# Patient Record
Sex: MALE | Race: WHITE | NOT HISPANIC OR LATINO | Employment: UNEMPLOYED | URBAN - METROPOLITAN AREA
[De-identification: names, ages, dates, MRNs, and addresses within clinical notes are randomized per-mention and may not be internally consistent; named-entity substitution may affect disease eponyms.]

---

## 2017-01-05 ENCOUNTER — LAB REQUISITION (OUTPATIENT)
Dept: LAB | Facility: HOSPITAL | Age: 52
End: 2017-01-05

## 2017-01-05 DIAGNOSIS — D12.4 BENIGN NEOPLASM OF DESCENDING COLON: ICD-10-CM

## 2017-01-05 DIAGNOSIS — D12.0 BENIGN NEOPLASM OF CECUM: ICD-10-CM

## 2017-01-05 DIAGNOSIS — D12.3 BENIGN NEOPLASM OF TRANSVERSE COLON: ICD-10-CM

## 2017-01-05 PROCEDURE — 88305 TISSUE EXAM BY PATHOLOGIST: CPT | Performed by: INTERNAL MEDICINE

## 2017-01-06 LAB
CYTO UR: NORMAL
LAB AP CASE REPORT: NORMAL
LAB AP CLINICAL INFORMATION: NORMAL
Lab: NORMAL
PATH REPORT.FINAL DX SPEC: NORMAL
PATH REPORT.GROSS SPEC: NORMAL

## 2019-07-28 ENCOUNTER — HOSPITAL ENCOUNTER (EMERGENCY)
Facility: HOSPITAL | Age: 54
Discharge: HOME | End: 2019-07-28
Attending: STUDENT IN AN ORGANIZED HEALTH CARE EDUCATION/TRAINING PROGRAM | Admitting: STUDENT IN AN ORGANIZED HEALTH CARE EDUCATION/TRAINING PROGRAM

## 2019-07-28 ENCOUNTER — APPOINTMENT (OUTPATIENT)
Dept: CT IMAGING | Facility: HOSPITAL | Age: 54
End: 2019-07-28

## 2019-07-28 VITALS
WEIGHT: 228.8 LBS | RESPIRATION RATE: 18 BRPM | DIASTOLIC BLOOD PRESSURE: 78 MMHG | SYSTOLIC BLOOD PRESSURE: 148 MMHG | BODY MASS INDEX: 30.99 KG/M2 | TEMPERATURE: 98 F | OXYGEN SATURATION: 99 % | HEIGHT: 72 IN | HEART RATE: 72 BPM

## 2019-07-28 DIAGNOSIS — N32.89 BLADDER WALL THICKENING: ICD-10-CM

## 2019-07-28 DIAGNOSIS — M54.50 ACUTE BILATERAL LOW BACK PAIN WITHOUT SCIATICA: Primary | ICD-10-CM

## 2019-07-28 LAB
ALBUMIN SERPL-MCNC: 4.5 G/DL (ref 3.5–5)
ALBUMIN/GLOB SERPL: 1.3 G/DL (ref 1–2)
ALP SERPL-CCNC: 88 U/L (ref 38–126)
ALT SERPL W P-5'-P-CCNC: 24 U/L (ref 13–69)
ANION GAP SERPL CALCULATED.3IONS-SCNC: 15 MMOL/L (ref 10–20)
AST SERPL-CCNC: 26 U/L (ref 15–46)
BASOPHILS # BLD AUTO: 0.06 10*3/MM3 (ref 0–0.2)
BASOPHILS NFR BLD AUTO: 0.9 % (ref 0–1.5)
BILIRUB SERPL-MCNC: 0.8 MG/DL (ref 0.2–1.3)
BILIRUB UR QL STRIP: NEGATIVE
BUN BLD-MCNC: 16 MG/DL (ref 7–20)
BUN/CREAT SERPL: 14.5 (ref 6.3–21.9)
CALCIUM SPEC-SCNC: 9.1 MG/DL (ref 8.4–10.2)
CHLORIDE SERPL-SCNC: 102 MMOL/L (ref 98–107)
CLARITY UR: CLEAR
CO2 SERPL-SCNC: 27 MMOL/L (ref 26–30)
COLOR UR: YELLOW
CREAT BLD-MCNC: 1.1 MG/DL (ref 0.6–1.3)
DEPRECATED RDW RBC AUTO: 42.7 FL (ref 37–54)
EOSINOPHIL # BLD AUTO: 0.28 10*3/MM3 (ref 0–0.4)
EOSINOPHIL NFR BLD AUTO: 4.2 % (ref 0.3–6.2)
ERYTHROCYTE [DISTWIDTH] IN BLOOD BY AUTOMATED COUNT: 12.5 % (ref 12.3–15.4)
GFR SERPL CREATININE-BSD FRML MDRD: 70 ML/MIN/1.73
GLOBULIN UR ELPH-MCNC: 3.6 GM/DL
GLUCOSE BLD-MCNC: 85 MG/DL (ref 74–98)
GLUCOSE UR STRIP-MCNC: NEGATIVE MG/DL
HCT VFR BLD AUTO: 47.8 % (ref 37.5–51)
HGB BLD-MCNC: 15.7 G/DL (ref 13–17.7)
HGB UR QL STRIP.AUTO: NEGATIVE
IMM GRANULOCYTES # BLD AUTO: 0.01 10*3/MM3 (ref 0–0.05)
IMM GRANULOCYTES NFR BLD AUTO: 0.1 % (ref 0–0.5)
KETONES UR QL STRIP: NEGATIVE
LEUKOCYTE ESTERASE UR QL STRIP.AUTO: NEGATIVE
LYMPHOCYTES # BLD AUTO: 1.95 10*3/MM3 (ref 0.7–3.1)
LYMPHOCYTES NFR BLD AUTO: 29.1 % (ref 19.6–45.3)
MCH RBC QN AUTO: 30.3 PG (ref 26.6–33)
MCHC RBC AUTO-ENTMCNC: 32.8 G/DL (ref 31.5–35.7)
MCV RBC AUTO: 92.1 FL (ref 79–97)
MONOCYTES # BLD AUTO: 0.54 10*3/MM3 (ref 0.1–0.9)
MONOCYTES NFR BLD AUTO: 8.1 % (ref 5–12)
NEUTROPHILS # BLD AUTO: 3.86 10*3/MM3 (ref 1.7–7)
NEUTROPHILS NFR BLD AUTO: 57.6 % (ref 42.7–76)
NITRITE UR QL STRIP: NEGATIVE
NRBC BLD AUTO-RTO: 0 /100 WBC (ref 0–0.2)
PH UR STRIP.AUTO: <=5 [PH] (ref 5–8)
PLATELET # BLD AUTO: 243 10*3/MM3 (ref 140–450)
PMV BLD AUTO: 10.2 FL (ref 6–12)
POTASSIUM BLD-SCNC: 4 MMOL/L (ref 3.5–5.1)
PROT SERPL-MCNC: 8.1 G/DL (ref 6.3–8.2)
PROT UR QL STRIP: NEGATIVE
RBC # BLD AUTO: 5.19 10*6/MM3 (ref 4.14–5.8)
SODIUM BLD-SCNC: 140 MMOL/L (ref 137–145)
SP GR UR STRIP: >=1.03 (ref 1–1.03)
UROBILINOGEN UR QL STRIP: NORMAL
WBC NRBC COR # BLD: 6.7 10*3/MM3 (ref 3.4–10.8)

## 2019-07-28 PROCEDURE — 25010000002 MORPHINE PER 10 MG: Performed by: STUDENT IN AN ORGANIZED HEALTH CARE EDUCATION/TRAINING PROGRAM

## 2019-07-28 PROCEDURE — 80053 COMPREHEN METABOLIC PANEL: CPT | Performed by: STUDENT IN AN ORGANIZED HEALTH CARE EDUCATION/TRAINING PROGRAM

## 2019-07-28 PROCEDURE — 74176 CT ABD & PELVIS W/O CONTRAST: CPT

## 2019-07-28 PROCEDURE — 96374 THER/PROPH/DIAG INJ IV PUSH: CPT

## 2019-07-28 PROCEDURE — 96375 TX/PRO/DX INJ NEW DRUG ADDON: CPT

## 2019-07-28 PROCEDURE — 99283 EMERGENCY DEPT VISIT LOW MDM: CPT

## 2019-07-28 PROCEDURE — 25010000002 ONDANSETRON PER 1 MG: Performed by: STUDENT IN AN ORGANIZED HEALTH CARE EDUCATION/TRAINING PROGRAM

## 2019-07-28 PROCEDURE — 81003 URINALYSIS AUTO W/O SCOPE: CPT | Performed by: STUDENT IN AN ORGANIZED HEALTH CARE EDUCATION/TRAINING PROGRAM

## 2019-07-28 PROCEDURE — 25010000002 KETOROLAC TROMETHAMINE PER 15 MG: Performed by: STUDENT IN AN ORGANIZED HEALTH CARE EDUCATION/TRAINING PROGRAM

## 2019-07-28 PROCEDURE — 85025 COMPLETE CBC W/AUTO DIFF WBC: CPT | Performed by: STUDENT IN AN ORGANIZED HEALTH CARE EDUCATION/TRAINING PROGRAM

## 2019-07-28 RX ORDER — NAPROXEN 500 MG/1
500 TABLET ORAL 2 TIMES DAILY WITH MEALS
Qty: 20 TABLET | Refills: 0 | Status: SHIPPED | OUTPATIENT
Start: 2019-07-28

## 2019-07-28 RX ORDER — METHOCARBAMOL 750 MG/1
1500 TABLET, FILM COATED ORAL 3 TIMES DAILY PRN
Qty: 20 TABLET | Refills: 0 | Status: SHIPPED | OUTPATIENT
Start: 2019-07-28

## 2019-07-28 RX ORDER — MORPHINE SULFATE 4 MG/ML
4 INJECTION, SOLUTION INTRAMUSCULAR; INTRAVENOUS ONCE
Status: COMPLETED | OUTPATIENT
Start: 2019-07-28 | End: 2019-07-28

## 2019-07-28 RX ORDER — KETOROLAC TROMETHAMINE 30 MG/ML
30 INJECTION, SOLUTION INTRAMUSCULAR; INTRAVENOUS ONCE
Status: COMPLETED | OUTPATIENT
Start: 2019-07-28 | End: 2019-07-28

## 2019-07-28 RX ORDER — ONDANSETRON 2 MG/ML
4 INJECTION INTRAMUSCULAR; INTRAVENOUS ONCE
Status: COMPLETED | OUTPATIENT
Start: 2019-07-28 | End: 2019-07-28

## 2019-07-28 RX ADMIN — ONDANSETRON 4 MG: 2 INJECTION INTRAMUSCULAR; INTRAVENOUS at 17:46

## 2019-07-28 RX ADMIN — MORPHINE SULFATE 4 MG: 4 INJECTION INTRAVENOUS at 17:48

## 2019-07-28 RX ADMIN — SODIUM CHLORIDE 1000 ML: 9 INJECTION, SOLUTION INTRAVENOUS at 17:48

## 2019-07-28 RX ADMIN — KETOROLAC TROMETHAMINE 30 MG: 30 INJECTION, SOLUTION INTRAMUSCULAR at 17:47

## 2024-02-18 ENCOUNTER — HOSPITAL ENCOUNTER (EMERGENCY)
Facility: HOSPITAL | Age: 59
Discharge: HOME | End: 2024-02-18
Attending: EMERGENCY MEDICINE | Admitting: EMERGENCY MEDICINE
Payer: COMMERCIAL

## 2024-02-18 ENCOUNTER — APPOINTMENT (OUTPATIENT)
Dept: CT IMAGING | Facility: HOSPITAL | Age: 59
End: 2024-02-18
Payer: COMMERCIAL

## 2024-02-18 VITALS
SYSTOLIC BLOOD PRESSURE: 140 MMHG | DIASTOLIC BLOOD PRESSURE: 100 MMHG | TEMPERATURE: 97.5 F | WEIGHT: 250 LBS | OXYGEN SATURATION: 95 % | HEIGHT: 72 IN | RESPIRATION RATE: 16 BRPM | BODY MASS INDEX: 33.86 KG/M2 | HEART RATE: 73 BPM

## 2024-02-18 DIAGNOSIS — R10.9 FLANK PAIN: Primary | ICD-10-CM

## 2024-02-18 LAB
ALBUMIN SERPL-MCNC: 4.5 G/DL (ref 3.5–5.2)
ALBUMIN/GLOB SERPL: 1.6 G/DL
ALP SERPL-CCNC: 90 U/L (ref 39–117)
ALT SERPL W P-5'-P-CCNC: 58 U/L (ref 1–41)
ANION GAP SERPL CALCULATED.3IONS-SCNC: 10.5 MMOL/L (ref 5–15)
AST SERPL-CCNC: 60 U/L (ref 1–40)
BACTERIA UR QL AUTO: ABNORMAL /HPF
BASOPHILS # BLD AUTO: 0.05 10*3/MM3 (ref 0–0.2)
BASOPHILS NFR BLD AUTO: 0.9 % (ref 0–1.5)
BILIRUB SERPL-MCNC: 1.1 MG/DL (ref 0–1.2)
BILIRUB UR QL STRIP: NEGATIVE
BUN SERPL-MCNC: 14 MG/DL (ref 6–20)
BUN/CREAT SERPL: 12.3 (ref 7–25)
CALCIUM SPEC-SCNC: 9.2 MG/DL (ref 8.6–10.5)
CHLORIDE SERPL-SCNC: 100 MMOL/L (ref 98–107)
CLARITY UR: CLEAR
CO2 SERPL-SCNC: 28.5 MMOL/L (ref 22–29)
COLOR UR: YELLOW
CREAT SERPL-MCNC: 1.14 MG/DL (ref 0.76–1.27)
DEPRECATED RDW RBC AUTO: 45.8 FL (ref 37–54)
EGFRCR SERPLBLD CKD-EPI 2021: 74.5 ML/MIN/1.73
EOSINOPHIL # BLD AUTO: 0.16 10*3/MM3 (ref 0–0.4)
EOSINOPHIL NFR BLD AUTO: 2.7 % (ref 0.3–6.2)
ERYTHROCYTE [DISTWIDTH] IN BLOOD BY AUTOMATED COUNT: 12.8 % (ref 12.3–15.4)
GLOBULIN UR ELPH-MCNC: 2.8 GM/DL
GLUCOSE SERPL-MCNC: 103 MG/DL (ref 65–99)
GLUCOSE UR STRIP-MCNC: NEGATIVE MG/DL
HCT VFR BLD AUTO: 49.1 % (ref 37.5–51)
HGB BLD-MCNC: 16.6 G/DL (ref 13–17.7)
HGB UR QL STRIP.AUTO: NEGATIVE
HYALINE CASTS UR QL AUTO: ABNORMAL /LPF
IMM GRANULOCYTES # BLD AUTO: 0.02 10*3/MM3 (ref 0–0.05)
IMM GRANULOCYTES NFR BLD AUTO: 0.3 % (ref 0–0.5)
KETONES UR QL STRIP: ABNORMAL
LEUKOCYTE ESTERASE UR QL STRIP.AUTO: ABNORMAL
LIPASE SERPL-CCNC: 38 U/L (ref 13–60)
LYMPHOCYTES # BLD AUTO: 1.86 10*3/MM3 (ref 0.7–3.1)
LYMPHOCYTES NFR BLD AUTO: 31.7 % (ref 19.6–45.3)
MCH RBC QN AUTO: 32.7 PG (ref 26.6–33)
MCHC RBC AUTO-ENTMCNC: 33.8 G/DL (ref 31.5–35.7)
MCV RBC AUTO: 96.8 FL (ref 79–97)
MONOCYTES # BLD AUTO: 0.5 10*3/MM3 (ref 0.1–0.9)
MONOCYTES NFR BLD AUTO: 8.5 % (ref 5–12)
MUCOUS THREADS URNS QL MICRO: ABNORMAL /HPF
NEUTROPHILS NFR BLD AUTO: 3.28 10*3/MM3 (ref 1.7–7)
NEUTROPHILS NFR BLD AUTO: 55.9 % (ref 42.7–76)
NITRITE UR QL STRIP: NEGATIVE
NRBC BLD AUTO-RTO: 0 /100 WBC (ref 0–0.2)
PH UR STRIP.AUTO: 5.5 [PH] (ref 5–8)
PLATELET # BLD AUTO: 228 10*3/MM3 (ref 140–450)
PMV BLD AUTO: 10.4 FL (ref 6–12)
POTASSIUM SERPL-SCNC: 4.6 MMOL/L (ref 3.5–5.2)
PROT SERPL-MCNC: 7.3 G/DL (ref 6–8.5)
PROT UR QL STRIP: NEGATIVE
RBC # BLD AUTO: 5.07 10*6/MM3 (ref 4.14–5.8)
RBC # UR STRIP: ABNORMAL /HPF
REF LAB TEST METHOD: ABNORMAL
SODIUM SERPL-SCNC: 139 MMOL/L (ref 136–145)
SP GR UR STRIP: 1.02 (ref 1–1.03)
SQUAMOUS #/AREA URNS HPF: ABNORMAL /HPF
UROBILINOGEN UR QL STRIP: ABNORMAL
WBC # UR STRIP: ABNORMAL /HPF
WBC NRBC COR # BLD AUTO: 5.87 10*3/MM3 (ref 3.4–10.8)

## 2024-02-18 PROCEDURE — 96374 THER/PROPH/DIAG INJ IV PUSH: CPT

## 2024-02-18 PROCEDURE — 83690 ASSAY OF LIPASE: CPT | Performed by: EMERGENCY MEDICINE

## 2024-02-18 PROCEDURE — 25010000002 KETOROLAC TROMETHAMINE PER 15 MG: Performed by: EMERGENCY MEDICINE

## 2024-02-18 PROCEDURE — 99285 EMERGENCY DEPT VISIT HI MDM: CPT

## 2024-02-18 PROCEDURE — 96375 TX/PRO/DX INJ NEW DRUG ADDON: CPT

## 2024-02-18 PROCEDURE — 85025 COMPLETE CBC W/AUTO DIFF WBC: CPT | Performed by: EMERGENCY MEDICINE

## 2024-02-18 PROCEDURE — 25010000002 MORPHINE PER 10 MG: Performed by: EMERGENCY MEDICINE

## 2024-02-18 PROCEDURE — 80053 COMPREHEN METABOLIC PANEL: CPT | Performed by: EMERGENCY MEDICINE

## 2024-02-18 PROCEDURE — 25510000001 IOPAMIDOL 61 % SOLUTION: Performed by: EMERGENCY MEDICINE

## 2024-02-18 PROCEDURE — 74177 CT ABD & PELVIS W/CONTRAST: CPT

## 2024-02-18 PROCEDURE — 81001 URINALYSIS AUTO W/SCOPE: CPT | Performed by: EMERGENCY MEDICINE

## 2024-02-18 PROCEDURE — 72128 CT CHEST SPINE W/O DYE: CPT

## 2024-02-18 RX ORDER — NAPROXEN 500 MG/1
500 TABLET ORAL 2 TIMES DAILY WITH MEALS
Qty: 30 TABLET | Refills: 0 | Status: SHIPPED | OUTPATIENT
Start: 2024-02-18

## 2024-02-18 RX ORDER — LIDOCAINE 50 MG/G
1 PATCH TOPICAL
Status: DISCONTINUED | OUTPATIENT
Start: 2024-02-18 | End: 2024-02-18 | Stop reason: HOSPADM

## 2024-02-18 RX ORDER — CYCLOBENZAPRINE HCL 10 MG
10 TABLET ORAL 3 TIMES DAILY PRN
Qty: 15 TABLET | Refills: 0 | Status: SHIPPED | OUTPATIENT
Start: 2024-02-18

## 2024-02-18 RX ORDER — KETOROLAC TROMETHAMINE 30 MG/ML
15 INJECTION, SOLUTION INTRAMUSCULAR; INTRAVENOUS ONCE
Status: COMPLETED | OUTPATIENT
Start: 2024-02-18 | End: 2024-02-18

## 2024-02-18 RX ORDER — LIDOCAINE 50 MG/G
1 PATCH TOPICAL EVERY 24 HOURS
Qty: 15 EACH | Refills: 0 | Status: SHIPPED | OUTPATIENT
Start: 2024-02-18

## 2024-02-18 RX ORDER — SODIUM CHLORIDE 0.9 % (FLUSH) 0.9 %
10 SYRINGE (ML) INJECTION AS NEEDED
Status: DISCONTINUED | OUTPATIENT
Start: 2024-02-18 | End: 2024-02-18 | Stop reason: HOSPADM

## 2024-02-18 RX ADMIN — KETOROLAC TROMETHAMINE 15 MG: 30 INJECTION, SOLUTION INTRAMUSCULAR; INTRAVENOUS at 11:48

## 2024-02-18 RX ADMIN — LIDOCAINE 1 PATCH: 50 PATCH TOPICAL at 11:48

## 2024-02-18 RX ADMIN — IOPAMIDOL 100 ML: 612 INJECTION, SOLUTION INTRAVENOUS at 11:40

## 2024-02-18 RX ADMIN — MORPHINE SULFATE 4 MG: 4 INJECTION, SOLUTION INTRAMUSCULAR; INTRAVENOUS at 11:48

## 2024-02-18 NOTE — ED PROVIDER NOTES
EMERGENCY DEPARTMENT ENCOUNTER    Pt Name: Robbin Stearns  MRN: [id number]  Pt :   1965  Room Number:    Date of encounter:  2024  PCP: Provider, No Known  ED Provider: Raheel Storm MD    Historian: Patient      HPI:  Chief Complaint   Patient presents with    Flank Pain          Context: Robbin Stearns is a 58 y.o. male who presents to the ED c/o right flank pain, present for months but much worse today, radiating to the right groin, patient has never had this before last couple months.  Denies heavy lifting, radiation of pain down the leg, denies nausea, vomiting, urinary difficulty.  Denies blood in his urine.  Denies history of kidney stones.      PAST MEDICAL HISTORY  History reviewed. No pertinent past medical history.      PAST SURGICAL HISTORY  Past Surgical History:   Procedure Laterality Date    BACK SURGERY           FAMILY HISTORY  History reviewed. No pertinent family history.      SOCIAL HISTORY  Social History     Socioeconomic History    Marital status:    Tobacco Use    Smoking status: Former    Smokeless tobacco: Current     Types: Snuff   Substance and Sexual Activity    Alcohol use: Yes    Drug use: No         ALLERGIES  Patient has no known allergies.        REVIEW OF SYSTEMS  Review of Systems   Constitutional:  Negative for chills and fever.   HENT:  Negative for sore throat and trouble swallowing.    Eyes:  Negative for pain and redness.   Respiratory:  Negative for cough and shortness of breath.    Cardiovascular:  Negative for chest pain and leg swelling.   Gastrointestinal:  Negative for abdominal pain, nausea and vomiting.   Genitourinary:  Positive for flank pain. Negative for dysuria and urgency.   Musculoskeletal:  Negative for back pain and neck pain.   Skin:  Negative for rash and wound.   Neurological:  Negative for dizziness and weakness.        All systems reviewed and negative except for those discussed in HPI.       PHYSICAL EXAM    I have  reviewed the triage vital signs and nursing notes.    ED Triage Vitals [02/18/24 1053]   Temp Heart Rate Resp BP SpO2   97.5 °F (36.4 °C) 73 16 (!) 164/106 95 %      Temp src Heart Rate Source Patient Position BP Location FiO2 (%)   -- -- -- -- --       Physical Exam  Constitutional:       Appearance: Normal appearance. He is not ill-appearing.   HENT:      Head: Normocephalic and atraumatic.      Right Ear: External ear normal.      Left Ear: External ear normal.      Nose: Nose normal.      Mouth/Throat:      Mouth: Mucous membranes are moist.      Pharynx: Oropharynx is clear.   Eyes:      Extraocular Movements: Extraocular movements intact.      Conjunctiva/sclera: Conjunctivae normal.      Pupils: Pupils are equal, round, and reactive to light.   Cardiovascular:      Rate and Rhythm: Normal rate and regular rhythm.      Pulses:           Radial pulses are 2+ on the right side and 2+ on the left side.      Heart sounds: No murmur heard.  Pulmonary:      Effort: Pulmonary effort is normal.      Breath sounds: Normal breath sounds.   Abdominal:      General: There is no distension.      Tenderness: There is no abdominal tenderness. There is no guarding.   Musculoskeletal:         General: No swelling or deformity.      Cervical back: Normal range of motion and neck supple.   Skin:     General: Skin is warm and dry.      Capillary Refill: Capillary refill takes less than 2 seconds.      Findings: No rash.   Neurological:      General: No focal deficit present.      Mental Status: He is alert and oriented to person, place, and time.            LAB RESULTS  Recent Results (from the past 24 hour(s))   Comprehensive Metabolic Panel    Collection Time: 02/18/24 11:19 AM    Specimen: Blood   Result Value Ref Range    Glucose 103 (H) 65 - 99 mg/dL    BUN 14 6 - 20 mg/dL    Creatinine 1.14 0.76 - 1.27 mg/dL    Sodium 139 136 - 145 mmol/L    Potassium 4.6 3.5 - 5.2 mmol/L    Chloride 100 98 - 107 mmol/L    CO2 28.5 22.0 -  29.0 mmol/L    Calcium 9.2 8.6 - 10.5 mg/dL    Total Protein 7.3 6.0 - 8.5 g/dL    Albumin 4.5 3.5 - 5.2 g/dL    ALT (SGPT) 58 (H) 1 - 41 U/L    AST (SGOT) 60 (H) 1 - 40 U/L    Alkaline Phosphatase 90 39 - 117 U/L    Total Bilirubin 1.1 0.0 - 1.2 mg/dL    Globulin 2.8 gm/dL    A/G Ratio 1.6 g/dL    BUN/Creatinine Ratio 12.3 7.0 - 25.0    Anion Gap 10.5 5.0 - 15.0 mmol/L    eGFR 74.5 >60.0 mL/min/1.73   Lipase    Collection Time: 02/18/24 11:19 AM    Specimen: Blood   Result Value Ref Range    Lipase 38 13 - 60 U/L   CBC Auto Differential    Collection Time: 02/18/24 11:19 AM    Specimen: Blood   Result Value Ref Range    WBC 5.87 3.40 - 10.80 10*3/mm3    RBC 5.07 4.14 - 5.80 10*6/mm3    Hemoglobin 16.6 13.0 - 17.7 g/dL    Hematocrit 49.1 37.5 - 51.0 %    MCV 96.8 79.0 - 97.0 fL    MCH 32.7 26.6 - 33.0 pg    MCHC 33.8 31.5 - 35.7 g/dL    RDW 12.8 12.3 - 15.4 %    RDW-SD 45.8 37.0 - 54.0 fl    MPV 10.4 6.0 - 12.0 fL    Platelets 228 140 - 450 10*3/mm3    Neutrophil % 55.9 42.7 - 76.0 %    Lymphocyte % 31.7 19.6 - 45.3 %    Monocyte % 8.5 5.0 - 12.0 %    Eosinophil % 2.7 0.3 - 6.2 %    Basophil % 0.9 0.0 - 1.5 %    Immature Grans % 0.3 0.0 - 0.5 %    Neutrophils, Absolute 3.28 1.70 - 7.00 10*3/mm3    Lymphocytes, Absolute 1.86 0.70 - 3.10 10*3/mm3    Monocytes, Absolute 0.50 0.10 - 0.90 10*3/mm3    Eosinophils, Absolute 0.16 0.00 - 0.40 10*3/mm3    Basophils, Absolute 0.05 0.00 - 0.20 10*3/mm3    Immature Grans, Absolute 0.02 0.00 - 0.05 10*3/mm3    nRBC 0.0 0.0 - 0.2 /100 WBC   Urinalysis With Microscopic If Indicated (No Culture) - Urine, Clean Catch    Collection Time: 02/18/24 11:27 AM    Specimen: Urine, Clean Catch   Result Value Ref Range    Color, UA Yellow Yellow, Straw    Appearance, UA Clear Clear    pH, UA 5.5 5.0 - 8.0    Specific Gravity, UA 1.023 1.005 - 1.030    Glucose, UA Negative Negative    Ketones, UA Trace (A) Negative    Bilirubin, UA Negative Negative    Blood, UA Negative Negative    Protein,  UA Negative Negative    Leuk Esterase, UA Trace (A) Negative    Nitrite, UA Negative Negative    Urobilinogen, UA 1.0 E.U./dL 0.2 - 1.0 E.U./dL   Urinalysis, Microscopic Only - Urine, Clean Catch    Collection Time: 02/18/24 11:27 AM    Specimen: Urine, Clean Catch   Result Value Ref Range    RBC, UA 3-5 (A) None Seen, 0-2 /HPF    WBC, UA 3-5 (A) None Seen, 0-2 /HPF    Bacteria, UA 1+ (A) None Seen /HPF    Squamous Epithelial Cells, UA 3-6 (A) None Seen, 0-2 /HPF    Hyaline Casts, UA 0-2 None Seen /LPF    Mucus, UA Trace None Seen, Trace /HPF    Methodology Manual Light Microscopy        If labs were ordered, I independently reviewed the results and considered them in treating the patient.        RADIOLOGY  CT Abdomen Pelvis With Contrast    Result Date: 2/18/2024  CT of the abdomen and pelvis with contrast  INDICATION: Flank pain, kidney stone suspected  COMPARISON: July 28, 2019  TECHNIQUE: Helically acquired axial multidetector CT images were obtained from the lung bases through the pelvis with IV contrast. Dose reduction techniques to achieve ALARA were employed.  Findings:  Lung bases: [No focal pneumonia].  Liver: Hepatic steatosis  Spleen: [Unremarkable]  Gallbladder/biliary tree: [ No biliary ductal dilatation].  Pancreas: [Grossly unremarkable].  Adrenals: [Unremarkable]  Kidneys: [No stone. No hydronephrosis].  GI: [No bowel obstruction]. Appendix is normal. Scattered colonic diverticulosis without acute diverticulitis. Decompressed colonic loops with nonspecific mild wall thickening.  Bladder: Decompressed bladder with wall thickening.  Reproductive structures: [Unremarkable]  Bones: [No significant osseous abnormalities are identified].  Soft Tissues: Tiny fat-containing umbilical hernia.        1. Decompressed bladder with wall thickening is nonspecific. Correlate clinically with urinalysis.  2. Decompressed colonic loops with mild wall thickening is nonspecific.   CTDI: 23.4 mGy DLP:1733.14 mGy.cm   This report was signed and finalized on 2/18/2024 1:55 PM by German Boyer MD.      CT Thoracic Spine Without Contrast    Result Date: 2/18/2024  CT T-SPINE.  HISTORY: Status post fall with neck pain.  COMPARISON:  None .  PROCEDURE: Thin section axial images were obtained of the thoracic spine without contrast. Coronal and sagittal reformatted images were performed and reviewed. This study was performed with techniques to keep radiation doses as low as reasonably achievable, (ALARA). Individualized dose reduction techniques using automated exposure control or adjustment of mA and/or kV according to the patient size were employed.  FINDINGS: There is mild levoscoliosis of the thoracic spine. Straightening of the thoracic spine kyphosis. There is no subluxation or fracture. The facets overlap at all levels. The spinous processes are normal.  The prevertebral soft tissues are normal. Moderate degenerative changes of the thoracic spine.      No acute process.    CTDI: 23.4 mGy DLP:1733.14 mGy.cm  This report was signed and finalized on 2/18/2024 12:08 PM by German Boyer MD.           PROCEDURES    Procedures    Interpretations    O2 Sat: The patients oxygen saturation was 95% on Room Air.  This was independently interpreted by me as Normal      Radiology: I ordered and independently reviewed the above noted radiographic studies.  I viewed images of CT abdomen which showed no renal stone per my independent interpretation. See radiologist's dictation for official interpretation.         MEDICATIONS GIVEN IN ER    Medications   sodium chloride 0.9 % flush 10 mL (has no administration in time range)   lidocaine (LIDODERM) 5 % 1 patch (1 patch Transdermal Medication Applied 2/18/24 1148)   morphine injection 4 mg (4 mg Intravenous Given 2/18/24 1148)   ketorolac (TORADOL) injection 15 mg (15 mg Intravenous Given 2/18/24 1148)   iopamidol (ISOVUE-300) 61 % injection 100 mL (100 mL Intravenous Given 2/18/24 1140)          MEDICAL DECISION MAKING, PROGRESS, and CONSULTS    All labs, if obtained, have been independently reviewed by me.  All radiology studies, if obtained, have been reviewed by me and the radiologist dictating the report.  All EKG's, if obtained, have been independently viewed and interpreted by me/my attending physician.      Discussion below represents my analysis of pertinent findings related to patient's condition, differential diagnosis, treatment plan and final disposition.      Differential diagnosis:    58-year-old male presented to the ED with complaint of flank pain, occurring on the right side, certainly could represent kidney stone but is a little low for kidney stone, as well the patient's pain is going on for months.  He certainly could have muscular pain, is a history of back surgery, he does not have any signs or symptoms of cauda equina or spinal cord compression but certainly could have muscle spasm low back.  Will obtain CT scan of the abdomen, CT scan spine, provide medications including Toradol, morphine, lidocaine patch.    Additional Sources:  None      Orders placed during this visit:  Orders Placed This Encounter   Procedures    CT Abdomen Pelvis With Contrast    CT Thoracic Spine Without Contrast    Comprehensive Metabolic Panel    Lipase    Urinalysis With Microscopic If Indicated (No Culture) - Urine, Clean Catch    CBC Auto Differential    Urinalysis, Microscopic Only - Urine, Clean Catch    Insert Peripheral IV    CBC & Differential         Additional orders considered but not ordered:  None    ED Course:    Consultants:  None    ED Course as of 02/18/24 1525   Sun Feb 18, 2024   1402 CT of the abdomen pelvis as well as CT of the spine are both negative, the patient's labs are fairly benign, few red blood cells in the urine, possibly patient passed a stone although his pain seems more to be musculoskeletal.  Will prescribe medications for musculoskeletal back pain. [CS]      ED  Course User Index  [CS] Raheel Storm MD           After my consideration of clinical presentation and any laboratory/radiology studies obtained, I discussed the findings with the patient/patient representative who is in agreement with the treatment plan and the final disposition. Risks and benefits of discharge were discussed.     AS OF 15:25 EST VITALS:    BP - 140/100  HR - 73  TEMP - 97.5 °F (36.4 °C)  O2 SATS - 95%    I reviewed the patients prescription monitoring report if available prior to discharge    DIAGNOSIS  Final diagnoses:   Flank pain         DISPOSITION  ED Disposition       ED Disposition   Discharge    Condition   Stable    Comment   --                   Please note that portions of this document were completed with voice recognition software.        Raheel Storm MD  02/18/24 [id number]